# Patient Record
Sex: FEMALE | Race: WHITE | URBAN - METROPOLITAN AREA
[De-identification: names, ages, dates, MRNs, and addresses within clinical notes are randomized per-mention and may not be internally consistent; named-entity substitution may affect disease eponyms.]

---

## 2024-09-23 ENCOUNTER — OFFICE VISIT (OUTPATIENT)
Dept: INTERNAL MEDICINE CLINIC | Facility: CLINIC | Age: 44
End: 2024-09-23

## 2024-09-23 VITALS
HEART RATE: 73 BPM | HEIGHT: 60 IN | DIASTOLIC BLOOD PRESSURE: 66 MMHG | BODY MASS INDEX: 31.34 KG/M2 | WEIGHT: 159.63 LBS | SYSTOLIC BLOOD PRESSURE: 120 MMHG

## 2024-09-23 DIAGNOSIS — Z00.00 PHYSICAL EXAM, ANNUAL: Primary | ICD-10-CM

## 2024-09-23 DIAGNOSIS — Z12.31 ENCOUNTER FOR SCREENING MAMMOGRAM FOR MALIGNANT NEOPLASM OF BREAST: ICD-10-CM

## 2024-09-23 DIAGNOSIS — E28.2 PCOD (POLYCYSTIC OVARIAN DISEASE): ICD-10-CM

## 2024-09-23 DIAGNOSIS — R73.03 PRE-DIABETES: ICD-10-CM

## 2024-09-23 DIAGNOSIS — E55.9 VITAMIN D DEFICIENCY: ICD-10-CM

## 2024-09-23 DIAGNOSIS — D50.0 IRON DEFICIENCY ANEMIA DUE TO CHRONIC BLOOD LOSS: ICD-10-CM

## 2024-09-23 DIAGNOSIS — Z01.419 ENCOUNTER FOR ROUTINE GYNECOLOGICAL EXAMINATION WITH PAPANICOLAOU SMEAR OF CERVIX: ICD-10-CM

## 2024-09-23 DIAGNOSIS — R97.1 ELEVATED CA-125: ICD-10-CM

## 2024-09-23 RX ORDER — MULTIVIT-MIN/IRON/FOLIC ACID/K 18-600-40
CAPSULE ORAL
COMMUNITY

## 2024-09-23 RX ORDER — FERROUS SULFATE 325(65) MG
325 TABLET, DELAYED RELEASE (ENTERIC COATED) ORAL
COMMUNITY

## 2024-09-23 NOTE — PROGRESS NOTES
Amanda Christensen is a 44 year old female.  Chief Complaint   Patient presents with    Establish Care     New pt     Gyn Exam       HPI:   New pt -used to see dr crystal at [place]   C/c establish care   C/o annual physical   Had seen gyn onc for elevated ca125 and had partial hysterectomy   Wondering about her hormones ,if acne is hormone related   Last yr her right knee started hurting and went to PT -no falls , trauma or injury  Just started suddenly     PMH  Vit d def   Anemia   Pcos   Prediabetes   Acne - ?hormonal, was on OCPs in the past     Lives with son , has 2 other children as well , works as a full time teacher     Current Outpatient Medications   Medication Sig Dispense Refill    Vitamin D, Cholecalciferol, 50 MCG (2000 UT) Oral Cap Take by mouth.      ferrous sulfate 325 (65 FE) MG Oral Tab EC Take 1 tablet (325 mg total) by mouth daily with breakfast.      Multiple Vitamins-Minerals (EMERGEN-C IMMUNE OR) Take by mouth.        History reviewed. No pertinent past medical history.   Past Surgical History:   Procedure Laterality Date    Hysterectomy      partial - 2 years ago    Tubal ligation        Social History:  Social History     Socioeconomic History    Marital status:    Tobacco Use    Smoking status: Never    Smokeless tobacco: Never   Vaping Use    Vaping status: Never Used   Substance and Sexual Activity    Alcohol use: Yes     Comment: social    Drug use: Never     Social Determinants of Health     Financial Resource Strain: Low Risk  (3/25/2020)    Received from [facility]    Overall Financial Resource Strain (CARDIA)     Difficulty of Paying Living Expenses: Not hard at all   Food Insecurity: No Food Insecurity (3/25/2020)    Received from [facility]    Hunger Vital Sign     Worried About Running Out of Food in the Last Year: Never true     Ran Out of Food in the Last Year: Never true   Transportation Needs: No Transportation Needs (3/25/2020)     Received from [facility]    PRAPARE - Transportation     Lack of Transportation (Medical): No     Lack of Transportation (Non-Medical): No   Physical Activity: Sufficiently Active (3/25/2020)    Received from [facility]    Exercise Vital Sign     Days of Exercise per Week: 7 days     Minutes of Exercise per Session: 30 min   Stress: No Stress Concern Present (3/25/2020)    Received from [facility]    [place] of [employer]ational [employer] - Occupational Stress Questionnaire     Feeling of Stress : Only a little    Received from [facility]    Social Connections    Received from [facility]    Housing Stability        REVIEW OF SYSTEMS:   GENERAL HEALTH: No fevers, chills, sweats, fatigue  VISION: No recent vision problems, blurry vision or double vision  HEENT: No decreased hearing ear pain nasal congestion or sore throat  SKIN: denies any unusual skin lesions or rashes  RESPIRATORY: denies shortness of breath, cough, wheezing  CARDIOVASCULAR: denies chest pain on exertion, palpitations, swelling in feet  GI: denies abdominal pain and denies heartburn, nausea or vomiting  : No Pain on urination, change in the color of urine, discharge, urinating frequently  MUS: No back pain, joint pain, muscle pain--chronic lower back and right knee pain   NEURO: denies headaches , anxiety, depression    EXAM:   /66   Pulse 73   Ht 5' (1.524 m)   Wt 159 lb 9.6 oz (72.4 kg)   BMI 31.17 kg/m²   GENERAL: well developed, well nourished,in no apparent distress  SKIN: no rashes,no suspicious lesions  HEENT: atraumatic, normocephalic,ears and throat are clear, no frontal or maxillary sinus tenderness, pupils equal reactive to light bilaterally, extraocular muscles intact  NECK: supple,no adenopathy,nontender   LUNGS: clear to auscultation, no wheeze  CARDIO: RRR without murmur  GI: good BS's,no masses or  tenderness  EXTREMITIES: no cyanosis, or edema    ASSESSMENT AND PLAN:   Diagnoses and all orders for this visit:    Physical exam, annual  -     Comp Metabolic Panel (14); Future  -     Lipid Panel; Future  -     Hemoglobin A1C; Future  -     TSH W Reflex To Free T4; Future  -     Vitamin D; Future  -     CBC, Platelet; No Differential; Future  Advised patient to watch her Teets exercise, seatbelt use no texting driving, sunscreen use advised    Encounter for screening mammogram for malignant neoplasm of breast  -     [facility] RENAN 2D+3D SCREENING BILAT (CPT=77067/09546); Future  Ordered     PCOD (polycystic ovarian disease)  Can see gyn     Iron deficiency anemia due to chronic blood loss  Recheck    Pre-diabetes  -     Hemoglobin A1C; Future  Follow a low carb low sugar diet and exercise with a goal of 30 min a day , monitor aic     Vitamin D deficiency  -     Vitamin D; Future  Recheck , monitor     Encounter for routine gynecological examination with Papanicolaou smear of cervix  -     OBG - INTERNAL  Refer     Elevated CA-125  -     -II [E]; Future  Recheck ,f/u gyn     Ance   Can try panxoyl and differen gel    Preventative medicine  Pap- will refer ,pap 6/2022 seen in [facility]   Cscope -will sign for the release of information as it was done at an outside facility and most likely within the past couple years  Mammogram -7/2023 seen in [facility]   Labs 2023 reviewed and new ones ordered to be done once fasting     The patient indicates understanding of these issues and agrees to the plan.  No follow-ups on file.

## 2024-09-28 ENCOUNTER — LAB ENCOUNTER (OUTPATIENT)
Dept: LAB | Facility: HOSPITAL | Age: 44
End: 2024-09-28
Attending: INTERNAL MEDICINE
Payer: COMMERCIAL

## 2024-09-28 DIAGNOSIS — R97.1 ELEVATED CA-125: ICD-10-CM

## 2024-09-28 DIAGNOSIS — Z00.00 PHYSICAL EXAM, ANNUAL: ICD-10-CM

## 2024-09-28 DIAGNOSIS — E55.9 VITAMIN D DEFICIENCY: ICD-10-CM

## 2024-09-28 DIAGNOSIS — R73.03 PRE-DIABETES: ICD-10-CM

## 2024-09-28 LAB
ALBUMIN SERPL-MCNC: 4.5 G/DL (ref 3.2–4.8)
ALBUMIN/GLOB SERPL: 1.5 {RATIO} (ref 1–2)
ALP LIVER SERPL-CCNC: 93 U/L
ALT SERPL-CCNC: 13 U/L
ANION GAP SERPL CALC-SCNC: 8 MMOL/L (ref 0–18)
AST SERPL-CCNC: 15 U/L (ref ?–34)
BILIRUB SERPL-MCNC: 0.4 MG/DL (ref 0.3–1.2)
BUN BLD-MCNC: 14 MG/DL (ref 9–23)
BUN/CREAT SERPL: 20.6 (ref 10–20)
CALCIUM BLD-MCNC: 9.3 MG/DL (ref 8.7–10.4)
CANCER AG125 SERPL-ACNC: 10 U/ML (ref ?–30.2)
CHLORIDE SERPL-SCNC: 105 MMOL/L (ref 98–112)
CHOLEST SERPL-MCNC: 180 MG/DL (ref ?–200)
CO2 SERPL-SCNC: 25 MMOL/L (ref 21–32)
CREAT BLD-MCNC: 0.68 MG/DL
DEPRECATED RDW RBC AUTO: 52.1 FL (ref 35.1–46.3)
EGFRCR SERPLBLD CKD-EPI 2021: 109 ML/MIN/1.73M2 (ref 60–?)
ERYTHROCYTE [DISTWIDTH] IN BLOOD BY AUTOMATED COUNT: 15.9 % (ref 11–15)
EST. AVERAGE GLUCOSE BLD GHB EST-MCNC: 111 MG/DL (ref 68–126)
FASTING PATIENT LIPID ANSWER: YES
FASTING STATUS PATIENT QL REPORTED: YES
GLOBULIN PLAS-MCNC: 3 G/DL (ref 2–3.5)
GLUCOSE BLD-MCNC: 106 MG/DL (ref 70–99)
HBA1C MFR BLD: 5.5 % (ref ?–5.7)
HCT VFR BLD AUTO: 36.8 %
HDLC SERPL-MCNC: 51 MG/DL (ref 40–59)
HGB BLD-MCNC: 11.8 G/DL
LDLC SERPL CALC-MCNC: 110 MG/DL (ref ?–100)
MCH RBC QN AUTO: 28.7 PG (ref 26–34)
MCHC RBC AUTO-ENTMCNC: 32.1 G/DL (ref 31–37)
MCV RBC AUTO: 89.5 FL
NONHDLC SERPL-MCNC: 129 MG/DL (ref ?–130)
OSMOLALITY SERPL CALC.SUM OF ELEC: 287 MOSM/KG (ref 275–295)
PLATELET # BLD AUTO: 364 10(3)UL (ref 150–450)
POTASSIUM SERPL-SCNC: 4.2 MMOL/L (ref 3.5–5.1)
PROT SERPL-MCNC: 7.5 G/DL (ref 5.7–8.2)
RBC # BLD AUTO: 4.11 X10(6)UL
SODIUM SERPL-SCNC: 138 MMOL/L (ref 136–145)
TRIGL SERPL-MCNC: 104 MG/DL (ref 30–149)
TSI SER-ACNC: 1.55 MIU/ML (ref 0.55–4.78)
VIT D+METAB SERPL-MCNC: 37.4 NG/ML (ref 30–100)
VLDLC SERPL CALC-MCNC: 18 MG/DL (ref 0–30)
WBC # BLD AUTO: 8.2 X10(3) UL (ref 4–11)

## 2024-09-28 PROCEDURE — 80053 COMPREHEN METABOLIC PANEL: CPT

## 2024-09-28 PROCEDURE — 86304 IMMUNOASSAY TUMOR CA 125: CPT

## 2024-09-28 PROCEDURE — 85027 COMPLETE CBC AUTOMATED: CPT

## 2024-09-28 PROCEDURE — 80061 LIPID PANEL: CPT

## 2024-09-28 PROCEDURE — 83036 HEMOGLOBIN GLYCOSYLATED A1C: CPT

## 2024-09-28 PROCEDURE — 84443 ASSAY THYROID STIM HORMONE: CPT

## 2024-09-28 PROCEDURE — 36415 COLL VENOUS BLD VENIPUNCTURE: CPT

## 2024-09-28 PROCEDURE — 82306 VITAMIN D 25 HYDROXY: CPT

## 2024-10-03 ENCOUNTER — OFFICE VISIT (OUTPATIENT)
Dept: FAMILY MEDICINE CLINIC | Facility: CLINIC | Age: 44
End: 2024-10-03
Payer: COMMERCIAL

## 2024-10-03 DIAGNOSIS — Z23 NEED FOR INFLUENZA VACCINATION: Primary | ICD-10-CM

## 2024-10-03 PROCEDURE — 90471 IMMUNIZATION ADMIN: CPT | Performed by: NURSE PRACTITIONER

## 2024-10-03 PROCEDURE — 90656 IIV3 VACC NO PRSV 0.5 ML IM: CPT | Performed by: NURSE PRACTITIONER

## 2024-10-18 ENCOUNTER — OFFICE VISIT (OUTPATIENT)
Dept: OBGYN CLINIC | Facility: CLINIC | Age: 44
End: 2024-10-18
Payer: COMMERCIAL

## 2024-10-18 VITALS
HEIGHT: 60 IN | BODY MASS INDEX: 31.15 KG/M2 | WEIGHT: 158.69 LBS | DIASTOLIC BLOOD PRESSURE: 72 MMHG | SYSTOLIC BLOOD PRESSURE: 120 MMHG

## 2024-10-18 DIAGNOSIS — N92.3 INTERMENSTRUAL BLEEDING: ICD-10-CM

## 2024-10-18 DIAGNOSIS — Z01.419 ENCOUNTER FOR ANNUAL ROUTINE GYNECOLOGICAL EXAMINATION: Primary | ICD-10-CM

## 2024-10-18 DIAGNOSIS — Z86.018 HISTORY OF BENIGN OVARIAN TUMOR: ICD-10-CM

## 2024-10-18 PROCEDURE — 3074F SYST BP LT 130 MM HG: CPT | Performed by: OBSTETRICS & GYNECOLOGY

## 2024-10-18 PROCEDURE — 3008F BODY MASS INDEX DOCD: CPT | Performed by: OBSTETRICS & GYNECOLOGY

## 2024-10-18 PROCEDURE — 99386 PREV VISIT NEW AGE 40-64: CPT | Performed by: OBSTETRICS & GYNECOLOGY

## 2024-10-18 PROCEDURE — 3078F DIAST BP <80 MM HG: CPT | Performed by: OBSTETRICS & GYNECOLOGY

## 2024-10-18 NOTE — PROGRESS NOTES
ANNUAL GYN EXAM  [facility]    CHIEF COMPLAINT:    Chief Complaint   Patient presents with    [facility]    Annual     C/o  spotting after menses for the last 2 cycles       HISTORY OF PRESENT ILLNESS:   Amanda Christensen is a 44 year old female   who presents for annual well woman visit.  She is feeling well without complaints.  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     Patient states last 2 menses had a couple days intermenstrual spotting.     States has had worsening acne of late.  She is     Per [facility] records:    TVUS In 2019 which showed a complex R ovarian cyst and simple cysts in the R and L ovaries. The patient's PCP ordered a CA-125 on 21 which was elevated at 101. The patient completed a repeat TVUS on 21 which revealed similar findings of a complex R ovarian cyst measuring 5 cm suggesting endometrioma as well as a simple cyst measuring 3.4 cm. Additionally, there is a simple cyst on the L ovary measuring 2.9 cm. She was referred to [facility].  s/p laparoscopic RSO and L ovarian cyst drainage on 21. Final pathology showed a serous borderline tumor. Pelvic washings were negative for malignant cells.       CT AP on 23 with fibroid uterus and several cystic structures in the left ovary largest measuring 2.5 cm.  TVUS on 23 with complex left ovarian cyst. CA-125 on 23 is 19, WNL.   Plan per     Pap on 22 is negative, HPV-.        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 Final pathology showed a serous borderline tumor. Pelvic washings were negative for malignant cells.    22:  TVUS with stable L ovarian cysts, 1.2 cm intramural fibroid.   22: TVUS with decreased size of left ovary with decreased size and complexity of several follicles/cysts.   23: CT AP with fibroid uterus and several cystic structures in  the left ovary largest measuring 2.5 cm  23: TVUS with complex left ovarian cyst     PAST GYNECOLOGICAL HISTORY & OTHER PREVENTIVE MEDICINE  LMP: Patient's last menstrual period was 10/05/2024.  Menarche: 8 yeras old (10/18/2024  3:30 PM)  Period Cycle (Days): monthly (10/18/2024  3:30 PM)  Period Duration (Days): 5 days (10/18/2024  3:30 PM)  Period Flow: MODERATE (10/18/2024  3:30 PM)  Use of Birth Control (if yes, specify type): Hysterectomy (10/18/2024  3:30 PM)  Date When Birth Control Last Used: PARTIAL HYSTERECTOMY (10/18/2024  3:30 PM)  Hx Prior Abnormal Pap: No (10/18/2024  3:30 PM)  Pap Date: 22 (10/18/2024  3:30 PM)  Pap Result Notes: NEG (10/18/2024  3:30 PM)  Follow Up Recommendation: LAST MAMMO DONE 2023 neg (10/18/2024  3:30 PM)    Complications: per HPI  Gravita/Parity:   Contraception: current -surgical - tubal sterilization; Previous - oral contraceptive pill  Sexually transmitted disease history:None  Number of sexual partners: current sexual partners: 0, yrs, Lifetime partners:   Pap history: 22 Last pap/result: NILM, neg HRHPV ; history abnormals: denies  Date of last mammogram: has order 2023; history abnormals denies  Last Colonoscopy: UTD; history abnormals none  Abuse history: denies  Vaginal discharge: denies  Bladder symptoms: denies    PAST MEDICAL HISTORY:   History reviewed. No pertinent past medical history.     PAST SURGICAL HISTORY:   Past Surgical History:   Procedure Laterality Date    Cyst removal      Oophorectomy      Tubal ligation          PAST OB HISTORY:  OB History    Para Term  AB Living   3 3 3     3   SAB IAB Ectopic Multiple Live Births           3      # Outcome Date GA Lbr Micheal/2nd Weight Sex Type Anes PTL Lv   3 Term 05 40w0d   M Vag-Spont   KASI   2 Term 11/15/04 40w0d   M Vag-Spont   KASI   1 Term 98 40w0d   F Vag-Spont   KASI       CURRENT MEDICATIONS:      Current Outpatient Medications:     Vitamin D,  Cholecalciferol, 50 MCG (2000 UT) Oral Cap, Take by mouth., Disp: , Rfl:     ferrous sulfate 325 (65 FE) MG Oral Tab EC, Take 1 tablet (325 mg total) by mouth daily with breakfast., Disp: , Rfl:     Multiple Vitamins-Minerals (EMERGEN-C IMMUNE OR), Take by mouth., Disp: , Rfl:     ALLERGIES:  Allergies[1]    SOCIAL HISTORY:  Social History     Socioeconomic History    Marital status:    Tobacco Use    Smoking status: Never    Smokeless tobacco: Never   Vaping Use    Vaping status: Never Used   Substance and Sexual Activity    Alcohol use: Yes     Comment: social    Drug use: Never    Sexual activity: Not Currently   Other Topics Concern    Blood Transfusions No       FAMILY HISTORY:  Family History   Problem Relation Age of Onset    Other (Other) Mother         parkinsons     ASSESSMENTS:  REVIEW OF SYSTEMS:  CONSTITUTIONAL:  negative for fevers, chills and sweats    EYES:  negative for  blurred vision and visual disturbance  RESPIRATORY:  negative for  cough and shortness of breath  CARDIOVASCULAR:  negative for  chest pain, palpitations  GASTROINTESTINAL:  No constipation/diarrhea, no pain  GENITOURINARY:  See History of Present Illness  INTEGUMENT/BREAST: Breast: no masses, no nipple discharge  ENDOCRINE:  negative for acne, constipation, diarrhea, cold intolerance, heat intolerance, fatigue, hair loss, weight gain and weight loss  MUSCULOSKELETAL:  negative for joint pain  NEUROLOGICAL:  negative for dizziness/lightheadedness and headaches  BEHAVIOR/PSYCH:  Negative for depressed mood, anhedonia and anxiety    PHYSICAL EXAM  Patient's last menstrual period was 10/05/2024.   Vitals:    10/18/24 1528   BP: 120/72   Weight: 158 lb 11.2 oz (72 kg)   Height: 60\"       CONSTITUTIONAL: Awake, alert, cooperative, no apparent distress, and appears stated age   NECK:  symmetrical, trachea midline, no adenopathy, thyroid symmetric, not enlarged   LUNGS: respiration unlabored  CARDIOVASCULAR: no peripheral edema or  varicosities, skin warm and dry  ABDOMEN: Soft, non-distended, non-tender, no masses palpated    CHEST/BREASTS: Breasts symmetrical, skin without lesion(s), no nipple retraction or dimpling, no nipple discharge, no masses palpated, no axillary or supraclavicular adenopathy  GENITAL/URINARY:    External Genitalia:  General appearance; normal, Hair distribution; normal, Lesions absent   Urethral Meatus:  Lesions absent, Prolapse absent  Bladder:  Tenderness absent, Cystocele absent  Vagina:  Discharge absent, Lesions absent, Pelvic support normal  Cervix:  Lesions absent, Discharge absent, Tenderness absent  Uterus:  Size normal, Masses absent, Tenderness absent  Adnexa:  Masses absent, Tenderness absent  Anus/Perineum:  Lesions absent    MUSCULOSKELETAL: There is no redness, warmth, or swelling of the joints.  Full range of motion noted.  Motor strength is 5 out of 5 all extremities bilaterally.  Tone is normal.  NEUROLOGIC: Patient is awake, alert and oriented to name, place and time.  Casual gait is normal.  SKIN: no bruising or bleeding and no rashes  PSYCHIATRIC: Behavior:  Appropriate  Mood:  appropriate  ASSESSMENT AND PLAN:  1. Encounter for annual routine gynecological examination  Pap up to date 2022  Recommend dermatology for acne managment    2. History of benign ovarian tumor  Follow up with  - Gynecology Oncology Referral - In Network    3. Intermenstrual bleeding  Follow up for saline ultrasound. If negative, can do endometrial biopsy same day. If positive, plan for hysteroscopic sampling       Preventive Medicine in a 44 year old female  Health Maintenance Topics with due status: Overdue       Topic Date Due    Mammogram Never done    COVID-19 Vaccine 09/01/2024       COUNSELING/EDUCATION PERFORMED:    Cervical Cancer Screening  Breast Cancer Screening - monthly self breast exam  Osteoporosis Screening  Colon Cancer screening   follow up 1 yr or as needed  Lucy Estrada MD         [1] No Known  Allergies

## 2024-10-21 ENCOUNTER — TELEPHONE (OUTPATIENT)
Dept: OBGYN CLINIC | Facility: CLINIC | Age: 44
End: 2024-10-21

## 2024-10-21 NOTE — TELEPHONE ENCOUNTER
Outgoing call to patient to schedule a saline ultrasound. Appointment has been scheduled.     No further action is required

## 2024-10-21 NOTE — TELEPHONE ENCOUNTER
Outgoing call to patient to reschedule an appointment made for a saline ultrasound due to a scheduling error. LDMTCB to assist in rescheduling.     No further action is required at this time

## 2024-12-23 ENCOUNTER — HOSPITAL ENCOUNTER (OUTPATIENT)
Dept: MAMMOGRAPHY | Facility: HOSPITAL | Age: 44
Discharge: HOME | End: 2024-12-23
Attending: INTERNAL MEDICINE
Payer: COMMERCIAL

## 2024-12-23 DIAGNOSIS — Z12.31 ENCOUNTER FOR SCREENING MAMMOGRAM FOR MALIGNANT NEOPLASM OF BREAST: ICD-10-CM

## 2024-12-23 PROCEDURE — 77063 BREAST TOMOSYNTHESIS BI: CPT | Performed by: INTERNAL MEDICINE

## 2024-12-23 PROCEDURE — 77067 SCR MAMMO BI INCL CAD: CPT | Performed by: INTERNAL MEDICINE

## 2025-01-20 ENCOUNTER — TELEPHONE (OUTPATIENT)
Dept: OBGYN CLINIC | Facility: CLINIC | Age: 45
End: 2025-01-20

## 2025-01-20 ENCOUNTER — OFFICE VISIT (OUTPATIENT)
Dept: OBGYN CLINIC | Facility: CLINIC | Age: 45
End: 2025-01-20
Payer: COMMERCIAL

## 2025-01-20 VITALS
HEART RATE: 77 BPM | DIASTOLIC BLOOD PRESSURE: 62 MMHG | SYSTOLIC BLOOD PRESSURE: 113 MMHG | WEIGHT: 154 LBS | HEIGHT: 60 IN | BODY MASS INDEX: 30.23 KG/M2

## 2025-01-20 DIAGNOSIS — N92.3 INTERMENSTRUAL BLEEDING: Primary | ICD-10-CM

## 2025-01-20 PROCEDURE — 3078F DIAST BP <80 MM HG: CPT | Performed by: OBSTETRICS & GYNECOLOGY

## 2025-01-20 PROCEDURE — 99204 OFFICE O/P NEW MOD 45 MIN: CPT | Performed by: OBSTETRICS & GYNECOLOGY

## 2025-01-20 PROCEDURE — 3074F SYST BP LT 130 MM HG: CPT | Performed by: OBSTETRICS & GYNECOLOGY

## 2025-01-20 PROCEDURE — 3008F BODY MASS INDEX DOCD: CPT | Performed by: OBSTETRICS & GYNECOLOGY

## 2025-01-20 NOTE — TELEPHONE ENCOUNTER
Schedule endosee with possible embx for this patient:  **Do referral if needed.**    Timing: D#7-10, with D#1 being flow  -- last menstrual period 1/19    Diagnosis: intermenstrual bleed    Blood tests: No -- hx BTL    Medication prep: n/a    Pain med prep: aleve 2 tabs one hour before    Procedure room: needed    Additional equipment: saline set-up

## 2025-01-23 ENCOUNTER — TELEPHONE (OUTPATIENT)
Dept: OBGYN CLINIC | Facility: CLINIC | Age: 45
End: 2025-01-23

## 2025-01-23 NOTE — TELEPHONE ENCOUNTER
Patient called in to schedule a Colposcopy appointment.  Request a nurse to call to schedule .  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

## 2025-01-23 NOTE — TELEPHONE ENCOUNTER
Amanda calling to schedule endosee with possible embx with Dr. Aquino. She is currently on day 6 of her cycle. Patient informed this procedure will be completed on days 7-10 of her cycle.    Patient aware that Dr. Aquino will be out of the office until this Monday, 1/27 (day 10 of patient's cycle).    Message to Dr. Aquino to please advise if this patient can be added to your schedule on Monday, 1/27 for endosee procedure? (Patient is aware we will most likely be letting her know in the morning the day of procedure). If not, patient is aware she will have to wait until next cycle.

## 2025-01-27 NOTE — TELEPHONE ENCOUNTER
Unfortunately no openings today. Please reach out on first day of next period. Also office likes to get done before 4 pm therefore most likely will need done when I am on call to meet need of doing procedure after 3 pm

## 2025-01-27 NOTE — TELEPHONE ENCOUNTER
Informed patient of Dr. Aquino message below. Patient verbalized understanding. Patient is appreciative of call back.

## 2025-01-28 NOTE — PROGRESS NOTES
Amanda Christensen is a 45 year old female  Patient's last menstrual period was 2025.   Chief Complaint   Patient presents with    Menstrual Problem     Here for 2nd opinion on intermenstrual bleed. Had annual w/ Saritaas in October   .        OBSTETRICS HISTORY:  OB History    Para Term  AB Living   3 3 3 0 0 3   SAB IAB Ectopic Multiple Live Births   0 0 0 0 3       GYNE HISTORY:  Periods regular monthly    History   Sexual Activity    Sexual activity: Not Currently       MEDICAL HISTORY:  No past medical history on file.  Past Surgical History:   Procedure Laterality Date    Oophorectomy      laparascopic    Tubal ligation      PPTL     OB History    Para Term  AB Living   3 3 3 0 0 3   SAB IAB Ectopic Multiple Live Births   0 0 0 0 3        SOCIAL HISTORY:  Social History     Socioeconomic History    Marital status:      Spouse name: Not on file    Number of children: Not on file    Years of education: Not on file    Highest education level: Not on file   Occupational History    Not on file   Tobacco Use    Smoking status: Never    Smokeless tobacco: Never   Vaping Use    Vaping status: Never Used   Substance and Sexual Activity    Alcohol use: Yes     Comment: social    Drug use: Never    Sexual activity: Not Currently   Other Topics Concern     Service Not Asked    Blood Transfusions No    Caffeine Concern Not Asked    Occupational Exposure Not Asked    Hobby Hazards Not Asked    Sleep Concern Not Asked    Stress Concern Not Asked    Weight Concern Not Asked    Special Diet Not Asked    Back Care Not Asked    Exercise Not Asked    Bike Helmet Not Asked    Seat Belt Not Asked    Self-Exams Not Asked   Social History Narrative    Not on file     Social Drivers of Health     Financial Resource Strain: Low Risk  (3/25/2020)    Received from [facility]    Overall Financial Resource Strain (CARDIA)     Difficulty of Paying Living  Expenses: Not hard at all   Food Insecurity: No Food Insecurity (3/25/2020)    Received from [facility]    Hunger Vital Sign     Worried About Running Out of Food in the Last Year: Never true     Ran Out of Food in the Last Year: Never true   Transportation Needs: No Transportation Needs (3/25/2020)    Received from [facility]    PRAPARE - Transportation     Lack of Transportation (Medical): No     Lack of Transportation (Non-Medical): No   Physical Activity: Sufficiently Active (3/25/2020)    Received from [facility]    Exercise Vital Sign     Days of Exercise per Week: 7 days     Minutes of Exercise per Session: 30 min   Stress: No Stress Concern Present (3/25/2020)    Received from [facility]    [place] of Occupational Health - Occupational Stress Questionnaire     Feeling of Stress : Only a little   Social Connections: Unknown (3/9/2021)    Received from [facility]    Social Connections     Conversations with friends/family/neighbors per week: Not on file   Housing Stability: Low Risk  (7/8/2021)    Received from [facility]    Housing Stability     Mortgage Payment Concerns?: Not on file     Number of Places Lived in the Last Year: Not on file     Unstable Housing?: Not on file       MEDICATIONS:    Current Outpatient Medications:     Vitamin D, Cholecalciferol, 50 MCG (2000 UT) Oral Cap, Take by mouth., Disp: , Rfl:     ferrous sulfate 325 (65 FE) MG Oral Tab EC, Take 1 tablet (325 mg total) by mouth daily with breakfast., Disp: , Rfl:     Multiple Vitamins-Minerals (EMERGEN-C IMMUNE OR), Take by mouth., Disp: , Rfl:     ALLERGIES:  Allergies[1]      Review of Systems:  Constitutional:    denies fatigue, night sweats, hot flashes  Gastrointestinal:  denies abdominal pain, diarrhea or constipation  Genitourinary:    denies dysuria, abnormal vaginal discharge, vaginal  itching  Musculoskeletal:   denies back pain.  Neurological:    denies headaches, extremity weakness or numbness.  Psychiatric:   denies depression or anxiety.    TVUS In June 2019 which showed a complex R ovarian cyst and simple cysts in the R and L ovaries. The patient's PCP ordered a CA-125 on 4/21/21 which was elevated at 101. The patient completed a repeat TVUS on 5/17/21 which revealed similar findings of a complex R ovarian cyst measuring 5 cm suggesting endometrioma as well as a simple cyst measuring 3.4 cm. Additionally, there is a simple cyst on the L ovary measuring 2.9 cm. She was referred to [facility].  s/p laparoscopic RSO and L ovarian cyst drainage on 12/23/21. Final pathology showed a serous borderline tumor. Pelvic washings were negative for malignant cells.      CT AP on 03/16/23 with fibroid uterus and several cystic structures in the left ovary largest measuring 2.5 cm.  TVUS on 07/18/23 with complex left ovarian cyst. CA-125 on 07/24/23 is 19, WNL.   Plan per     Pap on 7/22/22 is negative, HPV-.         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 Final pathology showed a serous borderline tumor. Pelvic washings were negative for malignant cells.    07/20/22:  TVUS with stable L ovarian cysts, 1.2 cm intramural fibroid.   11/25/22: TVUS with decreased size of left ovary with decreased size and complexity of several follicles/cysts.   03/16/23: CT AP with fibroid uterus and several cystic structures in the left ovary largest measuring 2.5 cm  07/18/23: TVUS with complex left ovarian cyst     PHYSICAL EXAM:   /62   Pulse 77   Ht 5' (1.524 m)   Wt 154 lb (69.9 kg)   LMP 01/19/2025   BMI 30.08 kg/m²   Constitutional:  well developed, well nourished  Head/Face: normocephalic  Psychiatric:   oriented to time, place, person and situation. Appropriate mood and affect    Assessment & Plan:    Amanda was seen  today for menstrual problem.    Diagnoses and all orders for this visit:    Intermenstrual bleeding  -     US PELVIS W EV (CPT=76856/04230); Future    Would do evaluation in office with endosee to see if polyp, if neg, can do endometrial biopsy at end. If polyp seen, then sampling would be done in OR.  Needs to time endosee D#7-10 -- rationale d/w pt in detail.  PMH significant for Stage 1A serous borderline tumor of R ovary.      Requested Prescriptions      No prescriptions requested or ordered in this encounter       Spent total time 45 minutes on obtaining history / chart review, evaluating patient / performing medically appropriate exam, discussing treatment options, counseling / educating, and completing documentation, coordinating care.                 [1] No Known Allergies

## 2025-02-09 ENCOUNTER — IMMUNIZATION (OUTPATIENT)
Dept: LAB | Age: 45
End: 2025-02-09
Attending: EMERGENCY MEDICINE
Payer: COMMERCIAL

## 2025-02-09 DIAGNOSIS — Z23 NEED FOR VACCINATION: Primary | ICD-10-CM

## 2025-02-09 PROCEDURE — 90480 ADMN SARSCOV2 VAC 1/ONLY CMP: CPT

## 2025-02-14 ENCOUNTER — TELEPHONE (OUTPATIENT)
Dept: OBGYN CLINIC | Facility: CLINIC | Age: 45
End: 2025-02-14

## 2025-02-14 NOTE — TELEPHONE ENCOUNTER
Patient called states she needs to schedule the Endosee with biopsy and she would like to have is scheduled for 2-17-25 if at all possible because she is off that day and this would be her 8th day of her cycle. She ask that someone please call her ASAP

## 2025-02-14 NOTE — TELEPHONE ENCOUNTER
Patient states period started 2/10/25 trying to schedule Endosee appointment for day #7-10  Patient prefers 2/17/25 since she is off work that day. Patient offered appointment for 2/19 however procedure rooms are not available. 2/18 there is a New OB appointment available at 8:00AM      Per Dr. Aquino note from 1/20/25:  Would do evaluation in office with endosee to see if polyp, if neg, can do endometrial biopsy at end. If polyp seen, then sampling would be done in OR.  Needs to time endosee D#7-10 -- rationale d/w pt in detail.  PMH significant for Stage 1A serous borderline tumor of R ovary.       To Dr. Aquino can patient be added to schedule 2/17/25 or New OB slot 2/18/25?

## 2025-02-15 NOTE — TELEPHONE ENCOUNTER
Patient called and accepts appointment on Tuesday for Endosee. Has tubal. Aleve recommendations given.

## 2025-02-18 ENCOUNTER — TELEPHONE (OUTPATIENT)
Dept: OBGYN CLINIC | Facility: CLINIC | Age: 45
End: 2025-02-18

## 2025-02-18 ENCOUNTER — PATIENT MESSAGE (OUTPATIENT)
Dept: OBGYN CLINIC | Facility: CLINIC | Age: 45
End: 2025-02-18

## 2025-02-18 ENCOUNTER — OFFICE VISIT (OUTPATIENT)
Dept: OBGYN CLINIC | Facility: CLINIC | Age: 45
End: 2025-02-18
Payer: COMMERCIAL

## 2025-02-18 VITALS
BODY MASS INDEX: 28 KG/M2 | DIASTOLIC BLOOD PRESSURE: 82 MMHG | WEIGHT: 145 LBS | HEART RATE: 78 BPM | SYSTOLIC BLOOD PRESSURE: 117 MMHG

## 2025-02-18 DIAGNOSIS — N92.3 INTERMENSTRUAL BLEEDING: Primary | ICD-10-CM

## 2025-02-18 PROCEDURE — 58555 HYSTEROSCOPY DX SEP PROC: CPT | Performed by: OBSTETRICS & GYNECOLOGY

## 2025-02-18 PROCEDURE — 3079F DIAST BP 80-89 MM HG: CPT | Performed by: OBSTETRICS & GYNECOLOGY

## 2025-02-18 PROCEDURE — 3074F SYST BP LT 130 MM HG: CPT | Performed by: OBSTETRICS & GYNECOLOGY

## 2025-02-18 NOTE — TELEPHONE ENCOUNTER
Please schedule the following surgery:    Procedure: hysteroscopic polypectomy & curettage    Date: D#7-10 in April    Diagnosis: intermenstrual bleed, endometrial polyp    Admission:Day surgery    Anesth: general    Preop Medical Clearance needed:  No    PCN allergy:  no antibiotic needed    Additional Orders: routine orders    Additional equipment:  Truclear    Rep needed: Yes    Additional surgery time needed: none    IPA tubal / hyst form signed: not applicable    Comments / Orders to Nurse: none        Follow-up: 2-3 weeks

## 2025-02-18 NOTE — PROGRESS NOTES
The following individual(s) verbally consented to be recorded using ambient AI listening technology and understand that they can each withdraw their consent to this listening technology at any point by asking the clinician to turn off or pause the recording:    Patient name: Amanda Christensen  Additional names:  N/A  
Statement Selected

## 2025-02-18 NOTE — PROCEDURES
Endosee Procedure     LMP: 2/10/25  Birth control method(s) used: Tubal Ligation    Consent signed.    Procedure discussed with the patient in detail including indication, risks, benefits, alternatives and complications.    Indication:  intermenstrual bleed    Procedure:   diagnostic hysteroscopy    Speculum placed in the vagina.  Betadine wash of vagina and cervix performed.  Single tooth tenaculum was placed at the 12 o'clock position.  Endocervical dilator placed within cervical canal until slight resistance bypassed and left in place.  Dilator exchanged for Endosee catheter.  Camera attached.  Assistant injected 1 cc / sec slowly allowing visualization of endometrial cavity.  Fallopian tubes os not seen bilaterally.  Findings: (+) polyp & thickened lining posteriorly  The fluid was withdrawn from cavity while camera was removed.  Single tooth tenaculum removed.  Silver nitrate used to achieve hemostasis.  Good hemostasis noted.  Patient tolerated procedure well.      Visit Plan:  Plan for myosure resection D#7-10 after ultrasound done (scheduled end of March)

## 2025-02-18 NOTE — TELEPHONE ENCOUNTER
Will await patient call day 1 of first full flow in order to coordinate surgery day 7-10 of cycle.

## 2025-03-24 ENCOUNTER — OFFICE VISIT (OUTPATIENT)
Dept: INTERNAL MEDICINE CLINIC | Facility: CLINIC | Age: 45
End: 2025-03-24
Payer: COMMERCIAL

## 2025-03-24 VITALS
SYSTOLIC BLOOD PRESSURE: 119 MMHG | HEART RATE: 99 BPM | HEIGHT: 60 IN | TEMPERATURE: 97 F | WEIGHT: 152 LBS | BODY MASS INDEX: 29.84 KG/M2 | DIASTOLIC BLOOD PRESSURE: 75 MMHG | OXYGEN SATURATION: 98 %

## 2025-03-24 DIAGNOSIS — N83.202 LEFT OVARIAN CYST: Primary | ICD-10-CM

## 2025-03-24 DIAGNOSIS — L70.0 ACNE VULGARIS: ICD-10-CM

## 2025-03-24 PROCEDURE — 3008F BODY MASS INDEX DOCD: CPT | Performed by: INTERNAL MEDICINE

## 2025-03-24 PROCEDURE — 99213 OFFICE O/P EST LOW 20 MIN: CPT | Performed by: INTERNAL MEDICINE

## 2025-03-24 PROCEDURE — G2211 COMPLEX E/M VISIT ADD ON: HCPCS | Performed by: INTERNAL MEDICINE

## 2025-03-24 PROCEDURE — 3074F SYST BP LT 130 MM HG: CPT | Performed by: INTERNAL MEDICINE

## 2025-03-24 PROCEDURE — 3078F DIAST BP <80 MM HG: CPT | Performed by: INTERNAL MEDICINE

## 2025-03-24 NOTE — PROGRESS NOTES
Amanda Christensen is a 45 year old female.  Chief Complaint   Patient presents with    Follow - Up       HPI:   PT COMES FOR F/U  C/C  follow up  C/o overall doing well   Doing a lot of medication and yoga           HISTORY  New pt -used to see dr crystal at [place]   C/c establish care   C/o annual physical   Had seen gyn onc for elevated ca125 and had partial hysterectomy   Wondering about her hormones ,if acne is hormone related   Last yr her right knee started hurting and went to PT -no falls , trauma or injury  Just started suddenly      PMH  Vit d def   Anemia   Pcos   Prediabetes   Acne - ?hormonal, was on OCPs in the past   Ovarian cyst -left      Lives with son , has 2 other children as well , works as a full time teacher     Current Outpatient Medications   Medication Sig Dispense Refill    Vitamin D, Cholecalciferol, 50 MCG (2000 UT) Oral Cap Take by mouth.      Multiple Vitamins-Minerals (EMERGEN-C IMMUNE OR) Take by mouth.      ferrous sulfate 325 (65 FE) MG Oral Tab EC Take 1 tablet (325 mg total) by mouth daily with breakfast.        No past medical history on file.   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    Tubal ligation      PPTL      Social History:  Social History     Socioeconomic History    Marital status:    Tobacco Use    Smoking status: Never    Smokeless tobacco: Never   Vaping Use    Vaping status: Never Used   Substance and Sexual Activity    Alcohol use: Yes     Comment: social    Drug use: Never    Sexual activity: Not Currently   Other Topics Concern    Blood Transfusions No     Social Drivers of Health     Food Insecurity: No Food Insecurity (3/24/2025)    NCSS - Food Insecurity     Worried About Running Out of Food in the Last Year: No     Ran Out of Food in the Last Year: No   Transportation Needs: No Transportation Needs (3/24/2025)    NCSS - Transportation      Lack of Transportation: No   Stress: No Stress Concern Present (3/25/2020)    Received from [facility] of Occupational Health - Occupational Stress Questionnaire     Feeling of Stress : Only a little   Housing Stability: Not At Risk (3/24/2025)    NCSS - Housing/Utilities     Has Housing: Yes     Worried About Losing Housing: No     Unable to Get Utilities: No        REVIEW OF SYSTEMS:   GENERAL HEALTH: No fevers, chills, sweats, fatigue  VISION: No recent vision problems, blurry vision or double vision  SKIN:+ acne   RESPIRATORY: denies shortness of breath, cough, wheezing  CARDIOVASCULAR: denies chest pain on exertion, palpitations, swelling in feet  NEURO: denies headaches , anxiety, depression    EXAM:   /75   Pulse 99   Temp 97.2 °F (36.2 °C)   Ht 5' (1.524 m)   Wt 152 lb (68.9 kg)   LMP 02/10/2025 (Exact Date)   SpO2 98%   BMI 29.69 kg/m²   GENERAL: well developed, well nourished,in no apparent distress  SKIN: no rashes,no suspicious lesions  HEENT: atraumatic, normocephalic   NECK: supple,no adenopathy, nontender   LUNGS: clear to auscultation, no wheeze  CARDIO: RRR without murmur  EXTREMITIES: no cyanosis, or edema    ASSESSMENT AND PLAN:   Diagnoses and all orders for this visit:    Left ovarian cyst  F/u gyn- also found to have polyp  , will go for pelvic ultrasound     Acne vulgaris  -     DERM - INTERNAL    Will refer           Preventative medicine  Pap- sees dr pate   Cscope - 4/2023 rpt in 2033   Mammogram -12/2024   Labs 9/2024 reviewed     The patient indicates understanding of these issues and agrees to the plan.  No follow-ups on file.

## 2025-03-28 ENCOUNTER — HOSPITAL ENCOUNTER (OUTPATIENT)
Dept: ULTRASOUND IMAGING | Facility: HOSPITAL | Age: 45
Discharge: HOME | End: 2025-03-28
Attending: OBSTETRICS & GYNECOLOGY
Payer: COMMERCIAL

## 2025-03-28 DIAGNOSIS — N92.3 INTERMENSTRUAL BLEEDING: ICD-10-CM

## 2025-03-28 PROCEDURE — 76856 US EXAM PELVIC COMPLETE: CPT | Performed by: OBSTETRICS & GYNECOLOGY

## 2025-03-28 PROCEDURE — 76830 TRANSVAGINAL US NON-OB: CPT | Performed by: OBSTETRICS & GYNECOLOGY

## 2025-05-19 ENCOUNTER — OFFICE VISIT (OUTPATIENT)
Dept: DERMATOLOGY CLINIC | Facility: CLINIC | Age: 45
End: 2025-05-19
Payer: COMMERCIAL

## 2025-05-19 DIAGNOSIS — Z51.81 MEDICATION MONITORING ENCOUNTER: ICD-10-CM

## 2025-05-19 DIAGNOSIS — L70.0 CYSTIC ACNE: ICD-10-CM

## 2025-05-19 DIAGNOSIS — L70.0 ACNE VULGARIS: Primary | ICD-10-CM

## 2025-05-19 PROCEDURE — 99203 OFFICE O/P NEW LOW 30 MIN: CPT | Performed by: DERMATOLOGY

## 2025-05-19 RX ORDER — TRETINOIN 0.5 MG/G
CREAM TOPICAL
Qty: 20 G | Refills: 3 | Status: SHIPPED | OUTPATIENT
Start: 2025-05-19

## 2025-05-19 RX ORDER — SPIRONOLACTONE 25 MG/1
TABLET ORAL
Qty: 90 TABLET | Refills: 1 | Status: SHIPPED | OUTPATIENT
Start: 2025-05-19

## 2025-05-19 NOTE — PROGRESS NOTES
The following individual(s) verbally consented to be recorded using ambient AI listening technology and understand that they can each withdraw their consent to this listening technology at any point by asking the clinician to turn off or pause the recording:    Patient name: Amanda Christensen  Additional names:

## 2025-05-24 ENCOUNTER — LAB ENCOUNTER (OUTPATIENT)
Dept: LAB | Age: 45
End: 2025-05-24
Attending: DERMATOLOGY
Payer: COMMERCIAL

## 2025-05-24 DIAGNOSIS — Z51.81 MEDICATION MONITORING ENCOUNTER: ICD-10-CM

## 2025-05-24 DIAGNOSIS — L70.0 ACNE VULGARIS: ICD-10-CM

## 2025-05-24 DIAGNOSIS — L70.0 CYSTIC ACNE: ICD-10-CM

## 2025-05-24 LAB — DHEA-S SERPL-MCNC: 88.5 UG/DL (ref 25.9–460.2)

## 2025-05-24 PROCEDURE — 84410 TESTOSTERONE BIOAVAILABLE: CPT

## 2025-05-24 PROCEDURE — 36415 COLL VENOUS BLD VENIPUNCTURE: CPT

## 2025-05-24 PROCEDURE — 82627 DEHYDROEPIANDROSTERONE: CPT

## 2025-05-26 NOTE — PROGRESS NOTES
Amanda Christensen is a 45 year old female.    Chief Complaint   Patient presents with    Acne     New Patient, presents for chronic acne vulgaris for facial breakouts on face and frontal scalp area. Applying otc 10% benzoyl-peroxide, ferrous sulfate 325 (65 FE) MG Oral Tab EC, Multiple Vitamins-Minerals (EMERGEN-C IMMUNE and Vitamin D, Cholecalciferol, 50 MCG (2000 UT) Oral Cap.             Patient has no known allergies.  Current Medications[1]   Past Medical History[2]   Social History:  Short Social Hx on File[3]              Current Medications[4]  Allergies:   Allergies[5]    Past Medical History[6]  Past Surgical History[7]  Social History     Socioeconomic History    Marital status:      Spouse name: Not on file    Number of children: Not on file    Years of education: Not on file    Highest education level: Not on file   Occupational History    Not on file   Tobacco Use    Smoking status: Never    Smokeless tobacco: Never   Vaping Use    Vaping status: Never Used   Substance and Sexual Activity    Alcohol use: Yes     Comment: social    Drug use: Never    Sexual activity: Not Currently   Other Topics Concern     Service Not Asked    Blood Transfusions No    Caffeine Concern Not Asked    Occupational Exposure Not Asked    Hobby Hazards Not Asked    Sleep Concern Not Asked    Stress Concern Not Asked    Weight Concern Not Asked    Special Diet Not Asked    Back Care Not Asked    Exercise Not Asked    Bike Helmet Not Asked    Seat Belt Not Asked    Self-Exams Not Asked    Grew up on a farm Not Asked    History of tanning Not Asked    Outdoor occupation Not Asked    Breast feeding Not Asked    Reaction to local anesthetic No    Pt has a pacemaker No    Pt has a defibrillator No   Social History Narrative    Not on file     Social Drivers of Health     Food Insecurity: No Food Insecurity (3/24/2025)    NCSS - Food Insecurity     Worried About Running Out of Food in the Last Year: No     Ran Out  of Food in the Last Year: No   Transportation Needs: No Transportation Needs (3/24/2025)    NCSS - Transportation     Lack of Transportation: No   Stress: No Stress Concern Present (3/25/2020)    Received from [facility] of Occupational Health - Occupational Stress Questionnaire     Feeling of Stress : Only a little   Housing Stability: Not At Risk (3/24/2025)    NCSS - Housing/Utilities     Has Housing: Yes     Worried About Losing Housing: No     Unable to Get Utilities: No     Family History[8]                   HPI :      Chief Complaint   Patient presents with    Acne     New Patient, presents for chronic acne vulgaris for facial breakouts on face and frontal scalp area. Applying otc 10% benzoyl-peroxide, ferrous sulfate 325 (65 FE) MG Oral Tab EC, Multiple Vitamins-Minerals (EMERGEN-C IMMUNE and Vitamin D, Cholecalciferol, 50 MCG (2000 UT) Oral Cap.       History of Present Illness  Amanda Christensen is a 45 year old female with polycystic ovary syndrome who presents with persistent facial cysts.    She has persistent cystic acne primarily on her face, described as constant and annoying. The condition has been ongoing for most of her adult life but has become more persistent recently. The acne is characterized by cysts on her face, with occasional smaller lesions around her neck. No significant acne on her back.    She recalls a previous treatment with a birth control pill prescribed by a dermatologist over ten years ago, which effectively cleared her skin at the time. However, she has not been on any similar medication since then and has a general aversion to taking pills.    She has a history of polycystic ovary syndrome (PCOS) and is concerned that her symptoms might be related to hormonal changes, possibly premenopausal in nature. No recent hormone level checks have been done.    In terms of lifestyle, she tries to maintain a healthy diet by avoiding bread and  processed foods, drinking plenty of water, and practicing yoga and meditation. She is mindful of her food portions and has been following this regimen since January.    Patient presents with concerns above.      Past notes/ records and appropriate/relevant lab results including pathology and past body maps reviewed. Updated and new information noted in current visit.       ROS:    Denies any other systemic complaints.  No fevers, chills, night sweats, sensitivity to the sun, deeper lumps or bumps.  No other skin complaints.  History, medications, allergies as noted.    Physical examination: Patient  well-developed well-nourished, alert oriented in no acute distress.  Exam of involved, appropriate areas of skin performed,  Remarkable for lesions as noted   Physical Exam        See map for details     ASSESSMENT AND PLAN:     Encounter Diagnoses   Name Primary?    Acne vulgaris Yes    Medication monitoring encounter     Cystic acne      Meds & Refills for this Visit:   Requested Prescriptions     Signed Prescriptions Disp Refills    spironolactone 25 MG Oral Tab 90 tablet 1     Si po every day x 1 week, then increase to 2 po every day x 2 weeks, then increase to 3 po every day as tolerated    Tretinoin 0.05 % External Cream 20 g 3     Sig: Apply a small amount to areas of acne as directed at bedtime.       Orders Placed This Encounter   Procedures    Dehydroepiandrosterone Sulfate    Testosterone,Total and Weakly Bound w/ SHBG     Assessment / plan:    Assessment & Plan  Cystic Acne  Chronic cystic acne primarily affecting the face, persistent despite lifestyle modifications. Potential hormonal influence due to possible PCOS. Accutane discussed as a treatment option, with concerns about recurrence post-treatment and significant side effects including xerosis, mood changes, and teratogenicity. Spironolactone considered as first-line treatment due to its hormonal action and relatively mild side effects such as  dizziness, polyuria, and potential menstrual irregularities. Retin-A (tretinoin) discussed for topical use with potential side effects of erythema, dryness, and irritation.  - Prescribe spironolactone  - Order blood tests to check androgen levels (DHEAS and testosterone)  - Provide information on Accutane, including side effects and logistical requirements  - Discuss Retin-A (tretinoin) for topical use  - Advise on the importance of photoprotection during the day    Polycystic Ovary Syndrome (PCOS)  PCOS with potential hormonal imbalance contributing to cystic acne. No recent evaluation of hormone levels. Discussion of dietary influences on PCOS, particularly the impact of high glycemic index foods. Emphasis on the importance of monitoring androgen levels to manage acne long-term.  - Order blood tests to evaluate hormone levels, including androgens  - Discuss potential referral to endocrinologist if hormone levels are elevated  - Advise on dietary modifications, particularly reducing high glycemic index foods    Recording duration: 14 minutes     Aldactone should be taken with the ocp, as it can cause male genitourinary defects with pregnancy. Usual side effects --possible more frequent urination, lower blood pressure, possible breast tenderness, irregular menses ( should not be a problem with OCP) .     Retinoids: Application instructions reviewed gentle cleanser, over moisturizer.  Hyaluronic acid continue moisturizers may be helpful at reducing irritation.  Start 2-3 times weekly slowly titrate up, increase to nightly as tolerated May need to decrease use with cold weather. Redness, peeling, irritation can develop.  Use cautiously with cosmetic procedures including hair removal such as waxing, laser procedures or peels.  Retinoids can be helpful at preventing further sun damage.    Monitor for new or changing lesions. Signs and symptoms of skin cancer, ABCDE's of melanoma ( additional information available at  AAD.org, skincancer.org) Encourage Sunscreen (broad-spectrum, ideally mineral-based-UVA/UVB -SPF 30 or higher) use encouraged, sun protection/sun protective clothing, self exams reviewed Followup as noted RTC ---routine checkup 6 mos -one year or p.r.n.    Encounter Times   Including precharting, reviewing chart, prior notes obtaining history: 10 minutes, medical exam :10 minutes, notes on body map, plan, counseling 10minutes My total time spent caring for the patient on the day of the encounter: 30 minutes     The patient indicates understanding of these issues and agrees to the plan.  The patient is asked to return as noted in follow-up/ above.    This note was generated using Dragon voice recognition software.  Please contact me regarding any confusion resulting from errors in recognition..  Note to patient and family: The 21st Century Cures Act makes medical notes like these available to patients. However, be advised this is a medical document. It is intended as [id number] communication and monitoring of a patient's care needs. It is written in medical language and may contain abbreviations or verbiage that are unfamiliar. It may appear blunt or direct. Medical documents are intended to carry relevant information, facts as evident and the clinical opinion of the practitioner.      Orders Placed This Encounter   Procedures    Dehydroepiandrosterone Sulfate    Testosterone,Total and Weakly Bound w/ SHBG           Encounter Diagnoses   Name Primary?    Acne vulgaris Yes    Medication monitoring encounter     Cystic acne        Orders Placed This Encounter   Procedures    Dehydroepiandrosterone Sulfate    Testosterone,Total and Weakly Bound w/ SHBG       Results From Past 48 Hours:  Recent Results (from the past 48 hours)   Dehydroepiandrosterone Sulfate    Collection Time: 05/24/25  7:56 AM   Result Value Ref Range    DHEA Sulfate 88.5 25.9 - 460.2 ug/dL       Meds This Visit:      Imaging Orders:  None      Referral Orders:  No orders of the defined types were placed in this encounter.                 [1]   Current Outpatient Medications   Medication Sig Dispense Refill    spironolactone 25 MG Oral Tab 1 po every day x 1 week, then increase to 2 po every day x 2 weeks, then increase to 3 po every day as tolerated 90 tablet 1    Tretinoin 0.05 % External Cream Apply a small amount to areas of acne as directed at bedtime. 20 g 3    Vitamin D, Cholecalciferol, 50 MCG (2000 UT) Oral Cap Take by mouth.      ferrous sulfate 325 (65 FE) MG Oral Tab EC Take 1 tablet (325 mg total) by mouth daily with breakfast.      Multiple Vitamins-Minerals (EMERGEN-C IMMUNE OR) Take by mouth.     [2] History reviewed. No pertinent past medical history.  [3]   Social History  Socioeconomic History    Marital status:    Tobacco Use    Smoking status: Never    Smokeless tobacco: Never   Vaping Use    Vaping status: Never Used   Substance and Sexual Activity    Alcohol use: Yes     Comment: social    Drug use: Never    Sexual activity: Not Currently   Other Topics Concern    Blood Transfusions No    Reaction to local anesthetic No    Pt has a pacemaker No    Pt has a defibrillator No     Social Drivers of Health     Food Insecurity: No Food Insecurity (3/24/2025)    NCSS - Food Insecurity     Worried About Running Out of Food in the Last Year: No     Ran Out of Food in the Last Year: No   Transportation Needs: No Transportation Needs (3/24/2025)    NCSS - Transportation     Lack of Transportation: No   Stress: No Stress Concern Present (3/25/2020)    Received from [facility]    [place] of Occupational Health - Occupational Stress Questionnaire     Feeling of Stress : Only a little   Housing Stability: Not At Risk (3/24/2025)    NCSS - Housing/Utilities     Has Housing: Yes     Worried About Losing Housing: No     Unable to Get Utilities: No   [4]   Current Outpatient Medications   Medication Sig  Dispense Refill    spironolactone 25 MG Oral Tab 1 po every day x 1 week, then increase to 2 po every day x 2 weeks, then increase to 3 po every day as tolerated 90 tablet 1    Tretinoin 0.05 % External Cream Apply a small amount to areas of acne as directed at bedtime. 20 g 3    Vitamin D, Cholecalciferol, 50 MCG (2000 UT) Oral Cap Take by mouth.      ferrous sulfate 325 (65 FE) MG Oral Tab EC Take 1 tablet (325 mg total) by mouth daily with breakfast.      Multiple Vitamins-Minerals (EMERGEN-C IMMUNE OR) Take by mouth.     [5] No Known Allergies  [6] History reviewed. No pertinent past medical history.  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

## 2025-06-02 LAB
SEX HORM BIND GLOB: 84.3 NMOL/L
TESTOST % FREE+WEAK BND: 9.8 %
TESTOST FREE+WEAK BND: 2.6 NG/DL
TESTOSTERONE TOT /MS: 26.4 NG/DL

## 2025-07-22 ENCOUNTER — PATIENT MESSAGE (OUTPATIENT)
Dept: DERMATOLOGY CLINIC | Facility: CLINIC | Age: 45
End: 2025-07-22

## 2025-07-22 DIAGNOSIS — L70.0 ACNE VULGARIS: Primary | ICD-10-CM

## 2025-07-22 RX ORDER — SPIRONOLACTONE 25 MG/1
TABLET ORAL
Qty: 90 TABLET | Refills: 3 | Status: SHIPPED | OUTPATIENT
Start: 2025-07-22

## 2025-07-22 NOTE — TELEPHONE ENCOUNTER
Yes, 3 tabs daily correct (since there is a refill on the medication from pharmacy they will keep the original directions). New prescription sent, thank you

## 2025-07-22 NOTE — TELEPHONE ENCOUNTER
Refill Request for medication(s): spironolactone 25 MG Oral Tab     Last Office Visit: 05/19/25    Last Refill: 05/19/25    Pharmacy, Dosage verified: [id number] - [place], [place] - [address] [phone], [phone]    Condition Update (if applicable): pls see msg    Rx pended and sent to provider for approval, please advise. Thank You!

## 2025-07-25 ENCOUNTER — PATIENT MESSAGE (OUTPATIENT)
Dept: INTERNAL MEDICINE CLINIC | Facility: CLINIC | Age: 45
End: 2025-07-25

## 2025-07-28 NOTE — TELEPHONE ENCOUNTER
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

## 2025-07-28 NOTE — TELEPHONE ENCOUNTER
Attempted to call the patient to discuss her new health concern and schedule a sooner appointment as there are appointments available as of 07/28 at 2 pm. No answer, left a voicemail to call the office back and sent a my chart as well.

## 2025-07-29 ENCOUNTER — NURSE TRIAGE (OUTPATIENT)
Dept: INTERNAL MEDICINE CLINIC | Facility: CLINIC | Age: 45
End: 2025-07-29

## 2025-07-30 ENCOUNTER — OFFICE VISIT (OUTPATIENT)
Dept: INTERNAL MEDICINE CLINIC | Facility: CLINIC | Age: 45
End: 2025-07-30

## 2025-07-30 VITALS
OXYGEN SATURATION: 97 % | DIASTOLIC BLOOD PRESSURE: 74 MMHG | HEART RATE: 83 BPM | WEIGHT: 149.38 LBS | BODY MASS INDEX: 29.33 KG/M2 | HEIGHT: 60 IN | SYSTOLIC BLOOD PRESSURE: 109 MMHG

## 2025-07-30 DIAGNOSIS — E55.9 VITAMIN D DEFICIENCY: ICD-10-CM

## 2025-07-30 DIAGNOSIS — R41.840 DIFFICULTY CONCENTRATING: Primary | ICD-10-CM

## 2025-07-30 DIAGNOSIS — Z00.00 PHYSICAL EXAM, ANNUAL: ICD-10-CM

## 2025-07-30 DIAGNOSIS — M65.90 TENOSYNOVITIS: ICD-10-CM

## 2025-07-30 PROCEDURE — G2211 COMPLEX E/M VISIT ADD ON: HCPCS | Performed by: INTERNAL MEDICINE

## 2025-07-30 PROCEDURE — 99214 OFFICE O/P EST MOD 30 MIN: CPT | Performed by: INTERNAL MEDICINE

## 2025-07-30 PROCEDURE — 3074F SYST BP LT 130 MM HG: CPT | Performed by: INTERNAL MEDICINE

## 2025-07-30 PROCEDURE — 3008F BODY MASS INDEX DOCD: CPT | Performed by: INTERNAL MEDICINE

## 2025-07-30 PROCEDURE — 3078F DIAST BP <80 MM HG: CPT | Performed by: INTERNAL MEDICINE

## 2025-08-05 ENCOUNTER — TELEPHONE (OUTPATIENT)
Age: 45
End: 2025-08-05

## (undated) NOTE — LETTER
AUTHORIZATION FOR SURGICAL OPERATION OR OTHER PROCEDURE    1. I hereby authorize Dr. FELDMAN, and [facility] staff assigned to my case to perform the following operation and/or procedure at the [facility] site:    ENDOSEE    2.  My physician has explained the nature and purpose of the operation or other procedure, possible alternative methods of treatment, the risks involved, and the possibility of complication to me.  I acknowledge that no guarantee has been made as to the result that may be obtained.  3.  I recognize that, during the course of this operation, or other procedure, unforseen conditions may necessitate additional or different procedure than those listed above.  I, therefore, further authorize and request that the above named physician, his/her physician assistants or designees perform such procedures as are, in his/her professional opinion, necessary and desirable.  4.  Any tissue or organs removed in the operation or other procedure may be disposed of by and at the discretion of the [facility] and [facility].  5.  I understand that in the event of a medical emergency, I will be transported by local paramedics to [facility] or other hospital emergency department.  6.  I certify that I have read and fully understand the above consent to operation and/or other procedure.    7.  I acknowledge that my physician has explained sedation/analgesia administration to me including the risks and benefits.  I consent to the administration of sedation/analgesia as may be necessary or desirable in the judgement of my physician.    Witness signature: ___________________________________________________ Date:  ______/______/_____                    Time:  ________ A.M.  P.M.       Patient Name:  ______________________________________________________  (please print)      Patient signature:   ___________________________________________________             Relationship to Patient:           []  Parent    Responsible person                          []  Spouse  In case of minor or                    [] Other  _____________   Incompetent name:  __________________________________________________                               (please print)      _____________      Responsible person  In case of minor or  Incompetent signature:  _______________________________________________    Statement of Physician  My signature below affirms that prior to the time of the procedure, I have explained to the patient and/or his/her guardian, the risks and benefits involved in the proposed treatment and any reasonable alternative to the proposed treatment.  I have also explained the risks and benefits involved in the refusal of the proposed treatment and have answered the patient's questions.                        Date:  ______/______/_______  Provider                      Signature:  __________________________________________________________       Time:  ___________ A.M    P.M.

## (undated) NOTE — Clinical Note
5/25/2025  I had the pleasure of seeing your patient Amanda Christensen recently.  Thank you for allowing me to participate in the care for this patient.  Please see attached visit notes.  Please contact me with any questions or concerns.  Sincerely,  Anyi Obrien MD, FAAD, [facility]